# Patient Record
(demographics unavailable — no encounter records)

---

## 2017-05-21 NOTE — EMERGENCY ROOM VISIT NOTE
History


Report prepared by Donna:  Reyes Campuzano


Under the Supervision of:  Dr. Oscar Bazan M.D.


First contact with patient:  11:00


Stated Complaint:  NEAR SYNCOPE





History of Present Illness


The patient is a 38 year old female who presents to the Emergency Room with 

complaints of a sudden near syncopal episode occurring prior to arrival. The 

patient states that she was driving, and her heart started to race, and she 

felt very lightheaded. She states that she pulled over, and got an ambulance. 

She states that her heart rate was in the 150s, and she had high blood 

pressure. The patient states that she gets ocular migraines, and the aura from 

the migraine makes her anxious, and she gets slurred speech. Additionally, she 

was having PVCs with the anxiety. She states that this anxiety makes her heart 

race. She states that yesterday she was not thinking correctly, and this 

morning she was studying, and she started to feel like she was "crawling out of 

her skin". The patient denies any shortness of breath, nausea, vomiting, 

thyroid problems, trauma or injury, numbness, weakness, or headache. She 

additionally denies any recent travelling, swelling in legs, and a history of 

blood clots. She states that she does not smoke, and she occasionally drinks 

alcohol. The patient states that she took Claritin today because she felt like 

she had a lump in her throat.





   Source of History:  patient, spouse/significant other


   Onset:  prior to arrival


   Position:  other (global)


   Quality:  other (near syncopal episode)


   Timing:  other (sudden)


   Associated Symptoms:  No SOB, No headache, No nausea, No numbness, No 

vomiting, No weakness


Note:


Associated symptoms: Fast heart rate





Review of Systems


See HPI for pertinent positives & negatives. A total of 10 systems reviewed and 

were otherwise negative.





Past Medical & Surgical


Medical Problems:


(1) PVC (premature ventricular contraction)


Surgical Problems:


(1) History of appendectomy





Old medical records were reviewed. Nurse's notes were reviewed and I agree with.





Family History





Diabetes mellitus


Hypertension


Kidney disease


Kidney stones





Social History


Alcohol Use:  occasionally


Marital Status:  


Housing Status:  lives with family


Occupation Status:  employed





Current/Historical Medications


No Active Prescriptions or Reported Meds





Allergies


Coded Allergies:  


     Sulfa Drugs (Verified  Allergy, Mild, 9/1/12)





Physical Exam


Vital Signs











  Date Time  Temp Pulse Resp B/P Pulse Ox O2 Delivery O2 Flow Rate FiO2


 


5/21/17 13:54  57 16 116/71 99   


 


5/21/17 13:01  53 16 122/83 98 Room Air  


 


5/21/17 12:14  63 16 115/77 99 Room Air  


 


5/21/17 12:09  58      


 


5/21/17 11:07 36.7 92 18 161/92 92 Room Air  











Physical Exam


General: Non-ill appearing, mildly anxious middle aged female in no acute 

distress.


HEENT: Normal cephalic atraumatic.  Pupils are equal round and reactive to 

light.  Sclerae anicteric.  Extraocular movements are intact.  Oropharynx is 

pink with moist mucous membranes.  No swelling of the mouth lips or tongue.


Neck: Supple with a midline trachea.  No meningeal signs or stiffness, no JVD 

or bruits. No Stridor.


Chest: Clear to auscultation bilaterally.  No wheezes or rhonchi.  No increased 

work of breathing.


Heart: regular rate and rhythm. 


Abdomen: Soft nontender, nondistended without rebound guarding or rigidity.  


Extremities: No cyanosis clubbing or edema. No calf tenderness or assymetry


Spine/Back. Non tender to palpation. No CVA tenderness


Skin: Good turgor without rashes.


Neurologic exam: No tremor. Cranial nerves two through 12 are intact.  Motor 

and sensation are intact and symmetrical throughout.





Medical Decision & Procedures


ER Provider


Diagnostic Interpretation:


X-ray results as stated below per interpretation by me and the radiologist: 





CHEST ONE VIEW PORTABLE





HISTORY: Atypical  CHEST PAIN





COMPARISON: Chest 9/19/2012.





FINDINGS: The lungs are clear. Cardiac silhouette is normal in size. No pleural


effusions. No pneumothorax.





IMPRESSION:


No acute process.





Electronically signed by:  Carlito Arguelles M.D.


5/21/2017 12:39 PM





Dictated Date/Time:  5/21/2017 12:38 PM





Laboratory Results


5/21/17 11:55








Red Blood Count 4.59, Mean Corpuscular Volume 91.3, Mean Corpuscular Hemoglobin 

32.0, Mean Corpuscular Hemoglobin Concent 35.1, Mean Platelet Volume 11.3, 

Neutrophils (%) (Auto) 62.8, Lymphocytes (%) (Auto) 28.5, Monocytes (%) (Auto) 

6.4, Eosinophils (%) (Auto) 1.5, Basophils (%) (Auto) 0.5, Neutrophils # (Auto) 

2.47, Lymphocytes # (Auto) 1.12, Monocytes # (Auto) 0.25, Eosinophils # (Auto) 

0.06, Basophils # (Auto) 0.02





5/21/17 11:55

















Test


  5/21/17


11:55 5/21/17


12:03 5/21/17


12:50


 


White Blood Count


  3.93 K/uL


(4.8-10.8) 


  


 


 


Red Blood Count


  4.59 M/uL


(4.2-5.4) 


  


 


 


Hemoglobin


  14.7 g/dL


(12.0-16.0) 


  


 


 


Hematocrit 41.9 % (37-47)   


 


Mean Corpuscular Volume


  91.3 fL


() 


  


 


 


Mean Corpuscular Hemoglobin


  32.0 pg


(25-34) 


  


 


 


Mean Corpuscular Hemoglobin


Concent 35.1 g/dl


(32-36) 


  


 


 


Platelet Count


  183 K/uL


(130-400) 


  


 


 


Mean Platelet Volume


  11.3 fL


(7.4-10.4) 


  


 


 


Neutrophils (%) (Auto) 62.8 %   


 


Lymphocytes (%) (Auto) 28.5 %   


 


Monocytes (%) (Auto) 6.4 %   


 


Eosinophils (%) (Auto) 1.5 %   


 


Basophils (%) (Auto) 0.5 %   


 


Neutrophils # (Auto)


  2.47 K/uL


(1.4-6.5) 


  


 


 


Lymphocytes # (Auto)


  1.12 K/uL


(1.2-3.4) 


  


 


 


Monocytes # (Auto)


  0.25 K/uL


(0.11-0.59) 


  


 


 


Eosinophils # (Auto)


  0.06 K/uL


(0-0.5) 


  


 


 


Basophils # (Auto)


  0.02 K/uL


(0-0.2) 


  


 


 


RDW Standard Deviation


  38.6 fL


(36.4-46.3) 


  


 


 


RDW Coefficient of Variation


  11.5 %


(11.5-14.5) 


  


 


 


Immature Granulocyte % (Auto) 0.3 %   


 


Immature Granulocyte # (Auto)


  0.01 K/uL


(0.00-0.02) 


  


 


 


Anion Gap


  9.0 mmol/L


(3-11) 


  


 


 


Est Creatinine Clear Calc


Drug Dose 90.0 ml/min 


  


  


 


 


Estimated GFR (


American) 99.3 


  


  


 


 


Estimated GFR (Non-


American 85.7 


  


  


 


 


BUN/Creatinine Ratio 18.2 (10-20)   


 


Calcium Level


  8.9 mg/dl


(8.5-10.1) 


  


 


 


Total Bilirubin


  0.5 mg/dl


(0.2-1) 


  


 


 


Direct Bilirubin


  < 0.1 mg/dl


(0-0.2) 


  


 


 


Aspartate Amino Transf


(AST/SGOT) 15 U/L (15-37) 


  


  


 


 


Alanine Aminotransferase


(ALT/SGPT) 19 U/L (12-78) 


  


  


 


 


Alkaline Phosphatase


  43 U/L


() 


  


 


 


Total Protein


  7.6 gm/dl


(6.4-8.2) 


  


 


 


Albumin


  4.3 gm/dl


(3.4-5.0) 


  


 


 


Lipase


  151 U/L


() 


  


 


 


Thyroid Stimulating Hormone


(TSH) 2.480 uIu/ml


(0.300-4.500) 


  


 


 


Bedside D-Dimer


  


  320 ng/mlFEU


(0-450) 


 


 


Bedside Troponin I


  


  0.000 ng/ml


(0-0.045) 


 


 


Human Chorionic Gonadotropin,


Qual 


  


  NEG (NEG) 


 








Laboratory studies as stated above per my review.





Medications Administered











 Medications


  (Trade)  Dose


 Ordered  Sig/Fadia


 Route  Start Time


 Stop Time Status Last Admin


Dose Admin


 


 Sodium Chloride


  (Nss 1000ml)  1,000 ml @ 


 999 mls/hr  Q1H1M STAT


 IV  5/21/17 11:59


 5/21/17 12:59 DC 5/21/17 12:15


999 MLS/HR











ECG


Indication:  other (near syncope)


Rate (beats per minute):  54


Rhythm:  other (sinus with sinus arrhythmia)


Findings:  no acute ischemic change


Comparison ECG Date:  9/1/12


Change:


first degree AV block has resolved





ED Course


1100: Past medical records reviewed. The patient was evaluated in room C2, and 

a complete history and physical examination were performed.





1159: Sodium Chloride 1000 ml @ 200 mls/hr IV, Sodium Chloride 1000 ml @ 999 mls

/hr IV





1303: I reevaluated the patient, and she was feeling well, and she wants to go 

home.





1325: Upon reevaluation, the patient is feeling well. I discussed the results 

and treatment plan with her. She verbalized agreement of the treatment plan. 

The patient was discharged home.





Medical Decision


Differentials include, but are not limited to; arrhythmia, acute coronary 

syndrome, electrolyte or metabolic abnormality, anxiety, PE, thyroid disease





This patient comes in as described above.  She was placed in room C2.  She is 

here for treatment and evaluation of a near syncopal episode.  She felt her 

heart was racing.  She's had this before.  She feels better now and her heart 

is not racing.  IV access established, EKG was obtained, chest x-ray, multiple 

blood testing was obtained.  She was hydrated with IV.  Normal saline she 

remained stable and improved while she was in the ER.  EKG does not suggest 

acute coronary syndrome or arrhythmia.  There is nothing to suggest 

preexcitation.  D-dimer is within normal limits and a low pretest probability 

study makes PE highly unlikely.  Chest x-ray is unremarkable does not suggest 

congestive heart failure, pneumonia, or pneumothorax.  She's no acute electrode 

or metabolic abnormality.  She has nothing to suggest thyroid disease.  She is 

feeling good and would like to go home.  I recommend she follow up with her 

regular doctor next 1-2 days for recheck and return to the ER if: Worsening of 

symptoms, symptoms, fever chills, any new problems or concerns.  She is happy 

with the plan and discharged to home.





Impression





 Primary Impression:  


 Near syncope


 Additional Impression:  


 Palpitations





Scribe Attestation


The scribe's documentation has been prepared under my direction and personally 

reviewed by me in its entirety. I confirm that the note above accurately 

reflects all work, treatment, procedures, and medical decision making performed 

by me.





Departure Information


Dispostion


Home / Self-Care





Prescriptions





No Active Prescriptions or Reported Meds





Referrals


Yuko Macias D.O. (PCP)





Forms


HOME CARE DOCUMENTATION FORM,                                                 

               IMPORTANT VISIT INFORMATION





Patient Instructions


My [facility]





Additional Instructions





Rest.


Drink plenty of fluids.





Return if: Chest pain, shortness of breath, worsening symptoms, fever or chills

, any new problems or concerns.





Follow-up with your doctor for recheck in 1-2 days





Problem Qualifiers

## 2017-05-21 NOTE — DIAGNOSTIC IMAGING REPORT
CHEST ONE VIEW PORTABLE



HISTORY: Atypical  CHEST PAIN



COMPARISON: Chest 9/19/2012.



FINDINGS: The lungs are clear. Cardiac silhouette is normal in size. No pleural

effusions. No pneumothorax.



IMPRESSION:

No acute process.







Electronically signed by:  Carlito Arguelles M.D.

5/21/2017 12:39 PM



Dictated Date/Time:  5/21/2017 12:38 PM